# Patient Record
Sex: FEMALE | Race: BLACK OR AFRICAN AMERICAN | NOT HISPANIC OR LATINO | URBAN - METROPOLITAN AREA
[De-identification: names, ages, dates, MRNs, and addresses within clinical notes are randomized per-mention and may not be internally consistent; named-entity substitution may affect disease eponyms.]

---

## 2024-10-17 ENCOUNTER — OFFICE (OUTPATIENT)
Dept: URBAN - METROPOLITAN AREA PATHOLOGY 12 | Facility: PATHOLOGY | Age: 49
End: 2024-10-17
Payer: COMMERCIAL

## 2024-10-17 DIAGNOSIS — Z12.11 ENCOUNTER FOR SCREENING FOR MALIGNANT NEOPLASM OF COLON: ICD-10-CM

## 2024-10-17 DIAGNOSIS — D12.3 BENIGN NEOPLASM OF TRANSVERSE COLON: ICD-10-CM

## 2024-10-17 DIAGNOSIS — D12.2 BENIGN NEOPLASM OF ASCENDING COLON: ICD-10-CM

## 2024-10-17 PROBLEM — K63.5 POLYP OF COLON: Status: ACTIVE | Noted: 2024-10-17

## 2024-10-17 PROBLEM — K64.0 FIRST DEGREE HEMORRHOIDS: Status: ACTIVE | Noted: 2024-10-17

## 2024-10-17 PROCEDURE — 88305 TISSUE EXAM BY PATHOLOGIST: CPT | Performed by: STUDENT IN AN ORGANIZED HEALTH CARE EDUCATION/TRAINING PROGRAM

## 2024-12-17 ENCOUNTER — OFFICE (OUTPATIENT)
Dept: URBAN - METROPOLITAN AREA CLINIC 11 | Facility: CLINIC | Age: 49
End: 2024-12-17

## 2024-12-17 VITALS
SYSTOLIC BLOOD PRESSURE: 168 MMHG | WEIGHT: 151 LBS | HEART RATE: 77 BPM | HEIGHT: 60 IN | SYSTOLIC BLOOD PRESSURE: 154 MMHG | DIASTOLIC BLOOD PRESSURE: 94 MMHG | DIASTOLIC BLOOD PRESSURE: 96 MMHG | OXYGEN SATURATION: 96 %

## 2024-12-17 DIAGNOSIS — K59.00 CONSTIPATION, UNSPECIFIED: ICD-10-CM

## 2024-12-17 DIAGNOSIS — K64.4 RESIDUAL HEMORRHOIDAL SKIN TAGS: ICD-10-CM

## 2024-12-17 DIAGNOSIS — R14.0 ABDOMINAL DISTENSION (GASEOUS): ICD-10-CM

## 2024-12-17 PROCEDURE — 99214 OFFICE O/P EST MOD 30 MIN: CPT | Performed by: NURSE PRACTITIONER

## 2024-12-17 RX ORDER — DOCUSATE SODIUM 100 MG/1
CAPSULE, LIQUID FILLED ORAL
Qty: 60 | Refills: 6 | Status: ACTIVE
Start: 2024-09-10

## 2024-12-17 RX ORDER — HYDROCORTISONE ACETATE, PRAMOXINE HCL 2.5; 1 G/100G; G/100G
CREAM TOPICAL
Qty: 30 | Refills: 6 | Status: ACTIVE
Start: 2024-09-10

## 2024-12-17 NOTE — SERVICEHPINOTES
Ms. Umana is a 49 year old female here today with complaints of bloating, constipation, hemorrhoids. She has a long history of external hemorrhoids since the birth of her son 26 years ago. They flare up from time to time and she uses OTC hemorrhoidal preparations. A few weeks ago, they flared up and she started using tucks pads, preparation H which led to bright red blood when she wiped. She states there were a few days the bleeding was pretty significant but she denies any blood in stool or blood in the toilet. She saw her PCP and was told to try hydrocortisone suppositories which did not help. Her PCP did not do a rectal exam. She has had constipation most of her life and has bowel movements twice a week. She recently increased her dietary fiber and has already had 2 bowel movements this week. She has been on MiraLax daily for the last week but it has not helped much. She denies any bright red blood when she wipes for the last 3 days. She denies any nausea, vomiting, abdominal pain. 
arabella bell She had a colonoscopy on 10/17/24 that showed grade 1 internal/external hemorrhoids, tubular adenoma in the transverse colon and a sessile serrated adenoma in the ascending colon.arabella bellIn follow-up on 12/17/2024,  she is doing well today and has no complaints.  The hydrocortisone cream is doing well for her hemorrhoids.  She has been using MiraLax daily and having regular bowel movements.  She denies any nausea, vomiting, abdominal pain.

## 2024-12-17 NOTE — SERVICENOTES
Her hemorrhoids are doing well with topical hydrocortisone cream.  Will continue.  She will continue MiraLax daily stool softeners if needed.  Will see back in 1 year sooner if needed